# Patient Record
Sex: FEMALE | Race: WHITE | NOT HISPANIC OR LATINO | Employment: OTHER | URBAN - METROPOLITAN AREA
[De-identification: names, ages, dates, MRNs, and addresses within clinical notes are randomized per-mention and may not be internally consistent; named-entity substitution may affect disease eponyms.]

---

## 2017-02-10 ENCOUNTER — OFFICE VISIT (OUTPATIENT)
Dept: URGENT CARE | Facility: RETAIL CLINIC | Age: 57
End: 2017-02-10
Payer: COMMERCIAL

## 2017-02-10 VITALS
HEART RATE: 75 BPM | DIASTOLIC BLOOD PRESSURE: 67 MMHG | OXYGEN SATURATION: 95 % | TEMPERATURE: 97.4 F | SYSTOLIC BLOOD PRESSURE: 113 MMHG

## 2017-02-10 DIAGNOSIS — H60.91 OTITIS EXTERNA OF RIGHT EAR, UNSPECIFIED CHRONICITY, UNSPECIFIED TYPE: Primary | ICD-10-CM

## 2017-02-10 PROCEDURE — 99202 OFFICE O/P NEW SF 15 MIN: CPT | Performed by: PHYSICIAN ASSISTANT

## 2017-02-10 RX ORDER — NEOMYCIN SULFATE, POLYMYXIN B SULFATE AND HYDROCORTISONE 10; 3.5; 1 MG/ML; MG/ML; [USP'U]/ML
4 SUSPENSION/ DROPS AURICULAR (OTIC) 4 TIMES DAILY
Qty: 10 ML | Refills: 0 | Status: SHIPPED | OUTPATIENT
Start: 2017-02-10

## 2017-02-10 NOTE — PROGRESS NOTES
"Chief Complaint   Patient presents with     Ear Problem     has bppv, right ear feels full started yesterday       Initial /67 mmHg  Pulse 75  Temp(Src) 97.4  F (36.3  C) (Oral)  SpO2 95% Estimated body mass index is 29.73 kg/(m^2) as calculated from the following:    Height as of 4/10/15: 5' 1.5\" (1.562 m).    Weight as of 9/14/15: 159 lb 14.4 oz (72.53 kg).  Medication Reconciliation: complete    "

## 2017-02-10 NOTE — MR AVS SNAPSHOT
"              After Visit Summary   2/10/2017    Gypsy Copeland    MRN: [id number]           Patient Information     Date Of Birth          1960        Visit Information        Provider Department      2/10/2017 9:00 AM Teresita Amin PA-C [facility]        Today's Diagnoses     Otitis externa of right ear, unspecified chronicity, unspecified type    -  1        Follow-ups after your visit        Who to contact     You can reach your care team any time of the day by calling [phone].  Notification of test results:  If you have an abnormal lab result, we will notify you by phone as soon as possible.         Additional Information About Your Visit        MyChart Information     [employer] lets you send messages to your doctor, view your test results, renew your prescriptions, schedule appointments and more. To sign up, go to [email or web address] . Click on \"Log in\" on the left side of the screen, which will take you to the Welcome page. Then click on \"Sign up Now\" on the right side of the page.     You will be asked to enter the access code listed below, as well as some personal information. Please follow the directions to create your username and password.     Your access code is: [email or web address]  Expires: 2017  9:10 AM     Your access code will  in 90 days. If you need help or a new code, please call your [place] clinic or [phone].        Care EveryWhere ID     This is your [facility] EveryWhere ID. This could be used by other organizations to access your [place] medical records  [id number]        Your Vitals Were     Pulse Temperature Pulse Oximetry             75 97.4  F (36.3  C) (Oral) 95%          Blood Pressure from Last 3 Encounters:   02/10/17 113/67   09/14/15 126/64   04/10/15 132/80    Weight from Last 3 Encounters:   09/14/15 159 lb 14.4 oz (72.53 kg)   04/10/15 164 lb 1.6 oz (74.435 kg)   14 171 lb 4.8 oz (77.701 kg)              Today, you had the " following     No orders found for display         Today's Medication Changes          These changes are accurate as of: 2/10/17  9:10 AM.  If you have any questions, ask your nurse or doctor.               Start taking these medicines.        Dose/Directions    neomycin-polymyxin-hydrocortisone 3.5-77544-6 otic suspension   Commonly known as:  CORTISPORIN   Used for:  Otitis externa of right ear, unspecified chronicity, unspecified type   Started by:  Teresita Amin PA-C        Dose:  4 drop   Place 4 drops into the right ear 4 times daily   Quantity:  10 mL   Refills:  0            Where to get your medicines      These medications were sent to [facility] [address] [place] [address] - [address]  [address], [facility] [address]     Phone:  [phone]    - neomycin-polymyxin-hydrocortisone 3.5-12465-9 otic suspension             Primary Care Provider Office Phone # Fax #    Tanvi Dumont [phone]       [facility] [address] [facility] [address]        Thank you!     Thank you for choosing [facility]  for your care. Our goal is always to provide you with excellent care. Hearing back from our patients is one way we can continue to improve our services. Please take a few minutes to complete the written survey that you may receive in the mail after your visit with us. Thank you!             Your Updated Medication List - Protect others around you: Learn how to safely use, store and throw away your medicines at www.disposemymeds.org.          This list is accurate as of: 2/10/17  9:10 AM.  Always use your most recent med list.                   Brand Name Dispense Instructions for use    albuterol (2.5 MG/3ML) 0.083% neb solution          ALLEGRA PO      1 tablet daily during the spring       ASPIRIN PO      Take 81 mg by mouth daily       EPINEPHrine 0.3 MG/0.3ML injection    EPIPEN     Inject 0.3 mg into the muscle once as needed        guaiFENesin-codeine 100-10 MG/5ML Soln solution    ROBITUSSIN AC         neomycin-polymyxin-hydrocortisone 3.5-61140-2 otic suspension    CORTISPORIN    10 mL    Place 4 drops into the right ear 4 times daily       omeprazole 20 MG tablet      Take 1 tablet (20 mg) by mouth 2 times daily Take 30-60 minutes before a meal.       ondansetron 4 MG tablet    ZOFRAN    18 tablet    TAKE 1 TABLET BY MOUTH TWIC E A DAY AS NEEDED FOR NAUSE A       SUMAtriptan 50 MG tablet    IMITREX    18 tablet    TAKE 1 TABLET BY MOUTH AT ONSET OF HEADACHE MAY REPEAT IN 2 HOURS IFNEEDED *MAX OF 4 TABLETS PER 24 HOURS*       valACYclovir 1000 mg tablet    VALTREX    20 tablet    TAKE 1 TABLET BY MOUTH TWIC E A DAY

## 2017-02-10 NOTE — PROGRESS NOTES
SUBJECTIVE:   Pt. presenting to [facility] with a chief complaint of possible ear infection. Sl tendern-sometimes itchy rt ear since being in [place] and swimming in ocean 2 weeks ago.  Onset of symptoms gradual   Course of illness is same.    Severity mild  Current and Associated symptoms: ear pain right. No drainage or odor. - pt is not ill, afebrile, no URI symptoms.  Treatment measures tried include None tried.  Predisposing factors include swimming.        Smoker no    Past Medical History   Diagnosis Date     Fasciitis, unspecified      Planter fascitis     Allergic urticaria      Hives     Abdominal pain, left lower quadrant      Headache(784.0)      Calculus of kidney      OVARIAN CYST NEC/NOS 10/9/2006     Patient is now s/p oopherectomy     Past Surgical History   Procedure Laterality Date     C ligate fallopian tube       Hc hemorrhoidectomy w banding/ligation       C total abdom hysterectomy       Hysterectomy, Total Abdominal     Hc ugi endoscopy diag w biopsy  09/15/2005     Salpingo oophorectomy,r/l/fernanda  03/24/08     Bilateral, Lysis of adhesions     Colonoscopy  12/6/2010     COLONOSCOPY performed by OZ LÓPEZ at  GI     Patient Active Problem List   Diagnosis     Esophageal reflux     Gastritis and gastroduodenitis     CARDIOVASCULAR SCREENING; LDL GOAL LESS THAN 160     Plantar fascial fibromatosis     Car sickness     Migraine headache     Herpes simplex type 1 infection     Menopausal symptoms     Anxiety           OBJECTIVE:  /67 mmHg  Pulse 75  Temp(Src) 97.4  F (36.3  C) (Oral)  SpO2 95%    GENERAL APPEARANCE: healthy, alert and no distress. Appears well hydrated.  EYES: conjunctiva clear  HENT: Rt ear canal some inflammation and no watery yellowish drainage  and TM normal   Tender at tragus but not  pinna   Lt ear canal clear and TM normal   Nontender at tragus and pinna   Nose no congestion. no discharge  Mouth without ulcers or lesions. no erythema. no exudate.    NECK: supple, nontender, shoddy NT ant nodes   RESP: lungs clear to auscultation - no rales, rhonchi or wheezes. Breathing easily.  CV: regular rates and rhythm, normal S1 S2, no murmur noted  ABDOMEN:  soft, nontender, no HSM or masses and bowel sounds normal   SKIN: no suspicious lesions or rashes  no tenderness to palpate over  sinus areas.    ASSESSMENT:  Otitis externa of right ear, unspecified chronicity, unspecified type      PLAN:  Symptomatic measures   Keep ears dry  Ear hygiene discussed - no q tips  Prescriptions as below. Discussed indications, dosing, side affects and adverse reactions of medications with  Patient - cortisporin otic  Contagiousness and hygiene discussed.  Fever and pain  control measures discussed.     Follow up with your primary care provider if not improving, if symptoms do not resolve and anytime if worse.  [facility]  [phone]    PT is comfortable with this plan.  Electronically signed,  ANA Amin, PAC

## 2021-05-11 ENCOUNTER — TRANSFERRED RECORDS (OUTPATIENT)
Dept: MULTI SPECIALTY CLINIC | Facility: CLINIC | Age: 61
End: 2021-05-11

## 2022-10-18 ENCOUNTER — OFFICE VISIT (OUTPATIENT)
Dept: FAMILY MEDICINE | Facility: OTHER | Age: 62
End: 2022-10-18
Attending: NURSE PRACTITIONER

## 2022-10-18 VITALS
RESPIRATION RATE: 16 BRPM | TEMPERATURE: 98 F | WEIGHT: 158.2 LBS | HEART RATE: 75 BPM | BODY MASS INDEX: 29.11 KG/M2 | HEIGHT: 62 IN | SYSTOLIC BLOOD PRESSURE: 135 MMHG | DIASTOLIC BLOOD PRESSURE: 88 MMHG | OXYGEN SATURATION: 96 %

## 2022-10-18 DIAGNOSIS — L03.012 PARONYCHIA OF FINGER OF LEFT HAND: Primary | ICD-10-CM

## 2022-10-18 PROCEDURE — 99203 OFFICE O/P NEW LOW 30 MIN: CPT | Performed by: NURSE PRACTITIONER

## 2022-10-18 RX ORDER — CEPHALEXIN 500 MG/1
500 CAPSULE ORAL 2 TIMES DAILY
Qty: 14 CAPSULE | Refills: 0 | Status: SHIPPED | OUTPATIENT
Start: 2022-10-18 | End: 2022-10-25

## 2022-10-18 ASSESSMENT — PAIN SCALES - GENERAL: PAINLEVEL: NO PAIN (0)

## 2022-10-18 NOTE — NURSING NOTE
Patient presents for pain in the middle finger of left hand, swelling, redness, started about 2 weeks ago, hasn't improved.

## 2022-10-18 NOTE — PROGRESS NOTES
ASSESSMENT/PLAN:    I have reviewed the nursing notes.  I have reviewed the findings, diagnosis, plan and need for follow up with the patient.    1. Paronychia of finger of left hand  There is no specific localized area/abscess that I would anticipate being a successful spot for incision and drainage, thus no incision was performed. I recommend 3-4x epsom salt water soaks in warm water for 5-10 minutes along with oral antibiotic. Topical abx has not been helping. See AVS for additional information provided/discussed.   - cephALEXin (KEFLEX) 500 MG capsule; Take 1 capsule (500 mg) by mouth 2 times daily for 7 days  Dispense: 14 capsule; Refill: 0    Discussed warning signs/symptoms indicative of need to f/u    Follow up if symptoms persist or worsen or concerns    I explained my diagnostic considerations and recommendations to the patient, who voiced understanding and agreement with the treatment plan. All questions were answered. We discussed potential side effects of any prescribed or recommended therapies, as well as expectations for response to treatments.    Kandi Rizvi NP  10/18/2022  1:06 PM    HPI:  Gypsy Copeland is a 62 year old female who presents to [facility] today for concerns of pain in the middle finger of left hand, with some swelling at tip of finger around nail with redness, started about 2 weeks ago, hasn't improved. Has been using neosporin topically. She got a little pus out of it one day from the lateral aspect near the nailbed. Tender to the touch. No fever/chills. Not diabetic. No other symptoms are present. Denies nail biting.     ROS otherwise unremarkable.         Past Medical History:   Diagnosis Date     Abdominal pain, left lower quadrant      Allergic urticaria     Hives     Calculus of kidney      Fasciitis, unspecified     Planter fascitis     Headache(784.0)      OVARIAN CYST NEC/NOS 10/9/2006    Patient is now s/p oopherectomy     Past Surgical History:   Procedure  Laterality Date     COLONOSCOPY  12/6/2010    COLONOSCOPY performed by OZ LÓPEZ at  GI     HC HEMORRHOIDECTOMY W BANDING/LIGATION       HC UGI ENDOSCOPY DIAG W BIOPSY  09/15/2005     SALPINGO OOPHORECTOMY,R/L/HALEY  03/24/08    Bilateral, Lysis of adhesions     ZZC LIGATE FALLOPIAN TUBE       ZZC TOTAL ABDOM HYSTERECTOMY      Hysterectomy, Total Abdominal     Social History     Tobacco Use     Smoking status: Never     Smokeless tobacco: Never   Substance Use Topics     Alcohol use: No     Current Outpatient Medications   Medication Sig Dispense Refill     ALLEGRA OR 1 tablet daily during the spring       EPINEPHrine (EPIPEN) 0.3 MG/0.3ML injection Inject 0.3 mg into the muscle once as needed       omeprazole 20 MG tablet Take 1 tablet (20 mg) by mouth 2 times daily Take 30-60 minutes before a meal.       valACYclovir (VALTREX) 1000 mg tablet TAKE 1 TABLET BY MOUTH TWIC E A DAY 20 tablet 1     albuterol (2.5 MG/3ML) 0.083% nebulizer solution  (Patient not taking: Reported on 10/18/2022)       ASPIRIN PO Take 81 mg by mouth daily (Patient not taking: Reported on 10/18/2022)       guaiFENesin-codeine (ROBITUSSIN AC) 100-10 MG/5ML SOLN  (Patient not taking: Reported on 10/18/2022)       neomycin-polymyxin-hydrocortisone (CORTISPORIN) 3.5-40026-1 otic suspension Place 4 drops into the right ear 4 times daily (Patient not taking: Reported on 10/18/2022) 10 mL 0     ondansetron (ZOFRAN) 4 MG tablet TAKE 1 TABLET BY MOUTH TWIC E A DAY AS NEEDED FOR NAUSE A (Patient not taking: Reported on 10/18/2022) 18 tablet 1     SUMAtriptan (IMITREX) 50 MG tablet TAKE 1 TABLET BY MOUTH AT ONSET OF HEADACHE MAY REPEAT IN 2 HOURS IFNEEDED *MAX OF 4 TABLETS PER 24 HOURS* (Patient not taking: Reported on 10/18/2022) 18 tablet 2     Allergies   Allergen Reactions     Bees      Morphine Hives     Voltaren Other (See Comments)     Bleeding gums     Past medical history, past surgical history, current medications and allergies reviewed  "and accurate to the best of my knowledge.      ROS:  Refer to HPI    /88 (BP Location: Left arm, Cuff Size: Adult Regular)   Pulse 75   Temp 98  F (36.7  C) (Tympanic)   Resp 16   Ht 1.575 m (5' 2\")   Wt 71.8 kg (158 lb 3.2 oz)   SpO2 96%   BMI 28.94 kg/m      EXAM:  General Appearance: Well appearing 62 year old female, appropriate appearance for age. No acute distress   Respiratory: No increased work of breathing.  No cough appreciated.  Cardiac: RRR with no murmurs  Dermatological: + left hand: third digit: DIP joint-surrounding nailbed bilaterally there is generalized moderate erythema and swelling without obvious drainage. Slight warmth and tenderness. Without specific area of fluctuance.   Psychological: normal affect, alert, oriented, and pleasant.     "